# Patient Record
Sex: MALE | Race: BLACK OR AFRICAN AMERICAN | NOT HISPANIC OR LATINO | Employment: FULL TIME | URBAN - NONMETROPOLITAN AREA
[De-identification: names, ages, dates, MRNs, and addresses within clinical notes are randomized per-mention and may not be internally consistent; named-entity substitution may affect disease eponyms.]

---

## 2022-04-22 ENCOUNTER — OFFICE VISIT (OUTPATIENT)
Dept: FAMILY MEDICINE CLINIC | Facility: CLINIC | Age: 52
End: 2022-04-22

## 2022-04-22 VITALS
BODY MASS INDEX: 38.89 KG/M2 | TEMPERATURE: 98.7 F | HEIGHT: 66 IN | SYSTOLIC BLOOD PRESSURE: 130 MMHG | RESPIRATION RATE: 15 BRPM | OXYGEN SATURATION: 98 % | HEART RATE: 88 BPM | DIASTOLIC BLOOD PRESSURE: 82 MMHG | WEIGHT: 242 LBS

## 2022-04-22 DIAGNOSIS — E66.09 CLASS 2 OBESITY DUE TO EXCESS CALORIES WITH BODY MASS INDEX (BMI) OF 39.0 TO 39.9 IN ADULT, UNSPECIFIED WHETHER SERIOUS COMORBIDITY PRESENT: ICD-10-CM

## 2022-04-22 DIAGNOSIS — Z00.00 GENERAL MEDICAL EXAM: ICD-10-CM

## 2022-04-22 DIAGNOSIS — Z11.59 NEED FOR HEPATITIS C SCREENING TEST: ICD-10-CM

## 2022-04-22 DIAGNOSIS — Z13.220 SCREENING FOR HYPERLIPIDEMIA: ICD-10-CM

## 2022-04-22 DIAGNOSIS — Z12.5 SCREENING FOR PROSTATE CANCER: ICD-10-CM

## 2022-04-22 DIAGNOSIS — M25.512 ACUTE PAIN OF LEFT SHOULDER: Primary | ICD-10-CM

## 2022-04-22 DIAGNOSIS — Z13.1 SCREENING FOR DIABETES MELLITUS: ICD-10-CM

## 2022-04-22 PROBLEM — R79.89 DECREASED TESTOSTERONE LEVEL: Status: ACTIVE | Noted: 2022-04-22

## 2022-04-22 PROBLEM — R06.83 SNORING: Status: ACTIVE | Noted: 2022-04-22

## 2022-04-22 PROBLEM — N52.9 ERECTILE DYSFUNCTION: Status: ACTIVE | Noted: 2022-04-22

## 2022-04-22 PROBLEM — R53.83 FATIGUE: Status: ACTIVE | Noted: 2022-04-22

## 2022-04-22 PROBLEM — Z72.0 TOBACCO USER: Status: ACTIVE | Noted: 2022-04-22

## 2022-04-22 PROCEDURE — 99204 OFFICE O/P NEW MOD 45 MIN: CPT | Performed by: PHYSICIAN ASSISTANT

## 2022-04-22 RX ORDER — NABUMETONE 750 MG/1
750 TABLET, FILM COATED ORAL 2 TIMES DAILY
Qty: 60 TABLET | Refills: 1 | Status: SHIPPED | OUTPATIENT
Start: 2022-04-22 | End: 2022-11-02

## 2022-04-22 NOTE — PROGRESS NOTES
"      Patient Office Visit      Patient Name: Otilio Fletcher  : 1970   MRN: [id number]     Chief Complaint:    Chief Complaint   Patient presents with   • Shoulder Pain       History of Present Illness: Otilio Fletcher is a 51 y.o. male who is here today for left shoulder pain that started about a week ago.  He thinks this was caused by playing golf.  He is supposed to play golf again tomorrow and was hoping he could get a steroid shot to decrease inflammation.  He was taking some over-the-counter Aleve and this was helping.  He has some improvement but is still having stiffness and soreness left anterior shoulder area.  Patient says it has been years since he had any blood work.  He does note a history of borderline high blood sugar.    Subjective      Review of Systems:   Review of Systems   Musculoskeletal: Positive for arthralgias.        Past Medical History: History reviewed. No pertinent past medical history.    Past Surgical History:   Past Surgical History:   Procedure Laterality Date   • COLONOSCOPY         Family History:   Family History   Problem Relation Age of Onset   • Colon cancer Father        Social History:   Social History     Socioeconomic History   • Marital status:    Tobacco Use   • Smoking status: Never Smoker   • Smokeless tobacco: Never Used   Vaping Use   • Vaping Use: Never used   Substance and Sexual Activity   • Alcohol use: Defer   • Drug use: Defer   • Sexual activity: Defer       Allergies:   No Known Allergies    Objective     Physical Exam:  Vital Signs:   Vitals:    22 1348   BP: 130/82   BP Location: Left arm   Patient Position: Sitting   Cuff Size: Adult   Pulse: 88   Resp: 15   Temp: 98.7 °F (37.1 °C)   TempSrc: Temporal   SpO2: 98%   Weight: 110 kg (242 lb)   Height: 167.6 cm (66\")     Body mass index is 39.06 kg/m².        Physical Exam  Constitutional:       General: He is not in acute distress.     Appearance: Normal appearance. He is obese. "   Musculoskeletal:      Left shoulder: Tenderness ( Anterior) present. Normal range of motion.   Neurological:      Mental Status: He is alert.   Psychiatric:         Mood and Affect: Mood normal.         Behavior: Behavior normal.         Thought Content: Thought content normal.         Judgment: Judgment normal.         Assessment / Plan      Assessment/Plan:   Diagnoses and all orders for this visit:    1. Acute pain of left shoulder (Primary)  -     nabumetone (RELAFEN) 750 MG tablet; Take 1 tablet by mouth 2 (Two) Times a Day.  Dispense: 60 tablet; Refill: 1    I had a discussion with patient about the risk versus benefits of corticosteroid treatment.  Ideally this would be administered intra-articular to minimize systemic side effects.  With a history of borderline high blood sugar it would be best to avoid systemic corticosteroids.  I recommended some stretches and will try a prescription nonsteroidal anti-inflammatory.  I did give him a quantity of 60 with 1 refill of nabumetone 750 that can be taken twice a day.  I recommend he take this for a few days until symptoms improve then take only as needed for exacerbations.  He has not eaten all day so we will go ahead and get his blood work today for a physical and have him follow-up next week for the actual preventive physical.    2. Screening for diabetes mellitus  -     Hemoglobin A1c; Future  -     Hemoglobin A1c    3. Screening for hyperlipidemia  -     Lipid Panel; Future  -     Lipid Panel    4. Screening for prostate cancer  -     PSA Total, Reflex To Free; Future  -     PSA Total, Reflex To Free    5. General medical exam  -     CBC & Differential; Future  -     Comprehensive Metabolic Panel; Future  -     Lipid Panel; Future  -     Hemoglobin A1c; Future  -     Vitamin B12 & Folate; Future  -     Vitamin D 25 Hydroxy; Future  -     TSH; Future  -     Hepatitis C Antibody; Future  -     PSA Total, Reflex To Free; Future  -     PSA Total, Reflex To  Free  -     Hepatitis C Antibody  -     TSH  -     Vitamin D 25 Hydroxy  -     Vitamin B12 & Folate  -     Hemoglobin A1c  -     Lipid Panel  -     Comprehensive Metabolic Panel  -     CBC & Differential    6. Class 2 obesity due to excess calories with body mass index (BMI) of 39.0 to 39.9 in adult, unspecified whether serious comorbidity present    7. Need for hepatitis C screening test  -     Hepatitis C Antibody; Future  -     Hepatitis C Antibody    Medications:     Current Outpatient Medications:   •  nabumetone (RELAFEN) 750 MG tablet, Take 1 tablet by mouth 2 (Two) Times a Day., Disp: 60 tablet, Rfl: 1    I spent 30 minutes caring for Otilio on this date of service. This time includes time spent by me in the following activities:preparing for the visit, obtaining and/or reviewing a separately obtained history, performing a medically appropriate examination and/or evaluation , counseling and educating the patient/family/caregiver, ordering medications, tests, or procedures and documenting information in the medical record    Follow Up:   Return in about 1 week (around 4/29/2022) for Annual physical.    Anu Sarabia PA-C   [facility]

## 2022-04-23 LAB
25(OH)D3+25(OH)D2 SERPL-MCNC: 23.2 NG/ML (ref 30–100)
ALBUMIN SERPL-MCNC: 4.3 G/DL (ref 4–5)
ALBUMIN/GLOB SERPL: 1.5 {RATIO} (ref 1.2–2.2)
ALP SERPL-CCNC: 60 IU/L (ref 44–121)
ALT SERPL-CCNC: 34 IU/L (ref 0–44)
AST SERPL-CCNC: 25 IU/L (ref 0–40)
BASOPHILS # BLD AUTO: 0 X10E3/UL (ref 0–0.2)
BASOPHILS NFR BLD AUTO: 0 %
BILIRUB SERPL-MCNC: 0.3 MG/DL (ref 0–1.2)
BUN SERPL-MCNC: 14 MG/DL (ref 6–24)
BUN/CREAT SERPL: 13 (ref 9–20)
CALCIUM SERPL-MCNC: 9 MG/DL (ref 8.7–10.2)
CHLORIDE SERPL-SCNC: 104 MMOL/L (ref 96–106)
CHOLEST SERPL-MCNC: 219 MG/DL (ref 100–199)
CO2 SERPL-SCNC: 25 MMOL/L (ref 20–29)
CREAT SERPL-MCNC: 1.12 MG/DL (ref 0.76–1.27)
EGFRCR SERPLBLD CKD-EPI 2021: 80 ML/MIN/1.73
EOSINOPHIL # BLD AUTO: 0.1 X10E3/UL (ref 0–0.4)
EOSINOPHIL NFR BLD AUTO: 2 %
ERYTHROCYTE [DISTWIDTH] IN BLOOD BY AUTOMATED COUNT: 12.7 % (ref 11.6–15.4)
FOLATE SERPL-MCNC: 12.5 NG/ML
GLOBULIN SER CALC-MCNC: 2.9 G/DL (ref 1.5–4.5)
GLUCOSE SERPL-MCNC: 119 MG/DL (ref 65–99)
HBA1C MFR BLD: 5.8 % (ref 4.8–5.6)
HCT VFR BLD AUTO: 44.8 % (ref 37.5–51)
HCV AB S/CO SERPL IA: <0.1 S/CO RATIO (ref 0–0.9)
HDLC SERPL-MCNC: 45 MG/DL
HGB BLD-MCNC: 15 G/DL (ref 13–17.7)
IMM GRANULOCYTES # BLD AUTO: 0 X10E3/UL (ref 0–0.1)
IMM GRANULOCYTES NFR BLD AUTO: 0 %
LDLC SERPL CALC-MCNC: 141 MG/DL (ref 0–99)
LYMPHOCYTES # BLD AUTO: 2.5 X10E3/UL (ref 0.7–3.1)
LYMPHOCYTES NFR BLD AUTO: 37 %
MCH RBC QN AUTO: 29.1 PG (ref 26.6–33)
MCHC RBC AUTO-ENTMCNC: 33.5 G/DL (ref 31.5–35.7)
MCV RBC AUTO: 87 FL (ref 79–97)
MONOCYTES # BLD AUTO: 0.4 X10E3/UL (ref 0.1–0.9)
MONOCYTES NFR BLD AUTO: 6 %
NEUTROPHILS # BLD AUTO: 3.7 X10E3/UL (ref 1.4–7)
NEUTROPHILS NFR BLD AUTO: 55 %
PLATELET # BLD AUTO: 175 X10E3/UL (ref 150–450)
POTASSIUM SERPL-SCNC: 4.1 MMOL/L (ref 3.5–5.2)
PROT SERPL-MCNC: 7.2 G/DL (ref 6–8.5)
RBC # BLD AUTO: 5.16 X10E6/UL (ref 4.14–5.8)
SODIUM SERPL-SCNC: 141 MMOL/L (ref 134–144)
TRIGL SERPL-MCNC: 184 MG/DL (ref 0–149)
TSH SERPL DL<=0.005 MIU/L-ACNC: 1.38 UIU/ML (ref 0.45–4.5)
VIT B12 SERPL-MCNC: 947 PG/ML (ref 232–1245)
VLDLC SERPL CALC-MCNC: 33 MG/DL (ref 5–40)
WBC # BLD AUTO: 6.7 X10E3/UL (ref 3.4–10.8)

## 2022-04-25 LAB
PSA SERPL-MCNC: 0.4 NG/ML (ref 0–4)
REFLEX: NORMAL

## 2022-05-02 ENCOUNTER — OFFICE VISIT (OUTPATIENT)
Dept: FAMILY MEDICINE CLINIC | Facility: CLINIC | Age: 52
End: 2022-05-02

## 2022-05-02 VITALS
BODY MASS INDEX: 40.02 KG/M2 | RESPIRATION RATE: 15 BRPM | TEMPERATURE: 97.5 F | OXYGEN SATURATION: 98 % | WEIGHT: 249 LBS | HEART RATE: 78 BPM | DIASTOLIC BLOOD PRESSURE: 80 MMHG | SYSTOLIC BLOOD PRESSURE: 120 MMHG | HEIGHT: 66 IN

## 2022-05-02 DIAGNOSIS — S63.657S: ICD-10-CM

## 2022-05-02 DIAGNOSIS — Z79.899 HIGH RISK MEDICATION USE: ICD-10-CM

## 2022-05-02 DIAGNOSIS — R73.03 PREDIABETES: ICD-10-CM

## 2022-05-02 DIAGNOSIS — E78.2 MIXED HYPERLIPIDEMIA: ICD-10-CM

## 2022-05-02 DIAGNOSIS — E66.01 MORBID (SEVERE) OBESITY DUE TO EXCESS CALORIES: ICD-10-CM

## 2022-05-02 DIAGNOSIS — Z00.00 GENERAL MEDICAL EXAM: Primary | ICD-10-CM

## 2022-05-02 DIAGNOSIS — E55.9 VITAMIN D DEFICIENCY: ICD-10-CM

## 2022-05-02 PROBLEM — Z72.0 TOBACCO USER: Status: RESOLVED | Noted: 2022-04-22 | Resolved: 2022-05-02

## 2022-05-02 PROCEDURE — 99396 PREV VISIT EST AGE 40-64: CPT | Performed by: PHYSICIAN ASSISTANT

## 2022-05-02 RX ORDER — METFORMIN HYDROCHLORIDE 500 MG/1
500 TABLET, EXTENDED RELEASE ORAL
Qty: 90 TABLET | Refills: 1 | Status: SHIPPED | OUTPATIENT
Start: 2022-05-02 | End: 2022-11-02

## 2022-05-02 NOTE — PROGRESS NOTES
Annual Physical-Preventive Visit     Patient Name: Otilio Fletcher  : 1970   MRN: [id number]     Chief Complaint:    Chief Complaint   Patient presents with   • Annual Exam       History of Present Illness: Otilio Fletcher is a 51 y.o. male who is here today for their annual health maintenance and physical.  He had his labs drawn last visit and we need to review those.  Subjective      Review of Systems:   Review of Systems   Constitutional: Negative for fatigue.   Respiratory: Negative for shortness of breath.    Cardiovascular: Negative for chest pain, palpitations and leg swelling.        Past History:  Medical History: has a past medical history of Arnold-Chiari deformity (HCC), Bone cyst, Erectile dysfunction, Fatigue, Low testosterone, Snoring, and Tobacco use disorder.   Surgical History: has a past surgical history that includes Colonoscopy and Other surgical history.   Family History: family history includes Colon cancer in his father; Diabetes in an other family member; Hearing loss in an other family member; Hypertension in his father.   Social History: reports that he has never smoked. He has never used smokeless tobacco. Alcohol use questions deferred to the physician. Drug use questions deferred to the physician.    Health Maintenance   Topic Date Due   • ANNUAL PHYSICAL  Never done   • ZOSTER VACCINE (1 of 2) Never done   • COVID-19 Vaccine (2 - Booster for Reta series) 2021   • TDAP/TD VACCINES (1 - Tdap) 2023 (Originally 6/15/1990)   • INFLUENZA VACCINE  2022   • LIPID PANEL  2023   • COLORECTAL CANCER SCREENING  2027   • HEPATITIS C SCREENING  Completed   • Pneumococcal Vaccine 0-64  Aged Out        Immunization History   Administered Date(s) Administered   • COVID-19 (RETA) 2021       Medications:     Current Outpatient Medications:   •  nabumetone (RELAFEN) 750 MG tablet, Take 1 tablet by mouth 2 (Two) Times a Day., Disp: 60 tablet, Rfl: 1  •   "metFORMIN ER (GLUCOPHAGE-XR) 500 MG 24 hr tablet, Take 1 tablet by mouth Daily With Breakfast., Disp: 90 tablet, Rfl: 1    Allergies:   No Known Allergies    Depression: PHQ-2 Depression Screening  Little interest or pleasure in doing things?     Feeling down, depressed, or hopeless?     PHQ-2 Total Score        Predictive Model Details   No score data available for Risk of Fall         Objective     Physical Exam:  Vital Signs:   Vitals:    05/02/22 1529   BP: 120/80   BP Location: Left arm   Patient Position: Sitting   Cuff Size: Adult   Pulse: 78   Resp: 15   Temp: 97.5 °F (36.4 °C)   TempSrc: Temporal   SpO2: 98%   Weight: 113 kg (249 lb)   Height: 167.6 cm (66\")     Body mass index is 40.19 kg/m².   Class 3 Severe Obesity (BMI >=40). Obesity-related health conditions include the following: none. Obesity is newly identified. BMI is is above average; BMI management plan is completed. We discussed low calorie, low carb based diet program, portion control and increasing exercise.       Physical Exam  Constitutional:       Appearance: He is obese.   Cardiovascular:      Rate and Rhythm: Normal rate and regular rhythm.   Pulmonary:      Effort: Pulmonary effort is normal.      Breath sounds: Normal breath sounds.   Musculoskeletal:      Comments: There is some pain left MCP joint when he moves his little finger laterally.  This started about a month ago after he swung a golf club.  He notes about 50% improvement since this started.   Neurological:      General: No focal deficit present.   Psychiatric:         Thought Content: Thought content normal.         Judgment: Judgment normal.         Procedures    Assessment / Plan      Assessment/Plan:   Diagnoses and all orders for this visit:    1. General medical exam (Primary)    2. Prediabetes  -     metFORMIN ER (GLUCOPHAGE-XR) 500 MG 24 hr tablet; Take 1 tablet by mouth Daily With Breakfast.  Dispense: 90 tablet; Refill: 1  -     Hemoglobin A1c; Future    His " hemoglobin A1c was 5.8.  He is agreeable to starting metformin to slow progression.  Also counseled extensively on healthy diet and exercise to lose weight to keep this from further progressing.    3. Mixed hyperlipidemia  -     Lipid Panel; Future    I reviewed patient's lab results with him.  His total cholesterol is 219, HDL 45, , triglycerides 184.  His labs were not checked fasting.  I calculated cardiovascular risk score and this is less than 7.5% and he was borderline as to whether a statin was recommended.  He prefers to work on diet and exercise to lose some weight before considering a cholesterol-lowering medication.  He denies any family history of heart disease.    4. Morbid (severe) obesity due to excess calories (HCC)    Patient asked for a copy of his lab report as he does plan on seeing someone at [facility] regarding weight loss.    5. High risk medication use  -     CBC & Differential; Future  -     Comprehensive Metabolic Panel; Future    6. Sprain of metacarpophalangeal (MCP) joint of left little finger, sequela    Improving.  I recommend giving this more time.    7.  Vitamin D deficiency    I instructed patient to start vitamin D 4000 IUs over-the-counter daily and we will recheck a vitamin D prior to next visit in 6 months.        Current Outpatient Medications:   •  nabumetone (RELAFEN) 750 MG tablet, Take 1 tablet by mouth 2 (Two) Times a Day., Disp: 60 tablet, Rfl: 1  •  metFORMIN ER (GLUCOPHAGE-XR) 500 MG 24 hr tablet, Take 1 tablet by mouth Daily With Breakfast., Disp: 90 tablet, Rfl: 1    Follow Up:   Return in about 6 months (around 11/2/2022) for Recheck.    Healthcare Maintenance:   Counseling provided on losing weight with diet and exercise.  I also counseled him on updating vaccinations.  He declines a Tdap today.  I did strongly encourage him to get the shingles vaccine at the pharmacy, he will consider this.  He and his wife both plan to get their COVID boosters in the next  few weeks.    Otilio Fletcher voices understanding and acceptance of this advice.  AVS with preventive healthcare tips printed for patient.     Anu Sarabia PA-C  [facility]

## 2022-11-02 ENCOUNTER — OFFICE VISIT (OUTPATIENT)
Dept: FAMILY MEDICINE CLINIC | Facility: CLINIC | Age: 52
End: 2022-11-02

## 2022-11-02 VITALS
HEART RATE: 76 BPM | SYSTOLIC BLOOD PRESSURE: 142 MMHG | OXYGEN SATURATION: 96 % | BODY MASS INDEX: 37.86 KG/M2 | DIASTOLIC BLOOD PRESSURE: 82 MMHG | WEIGHT: 235.6 LBS | HEIGHT: 66 IN

## 2022-11-02 DIAGNOSIS — R73.03 PREDIABETES: Primary | ICD-10-CM

## 2022-11-02 DIAGNOSIS — E78.2 MIXED HYPERLIPIDEMIA: ICD-10-CM

## 2022-11-02 DIAGNOSIS — Z79.899 HIGH RISK MEDICATION USE: ICD-10-CM

## 2022-11-02 DIAGNOSIS — E55.9 VITAMIN D DEFICIENCY: ICD-10-CM

## 2022-11-02 PROCEDURE — 99214 OFFICE O/P EST MOD 30 MIN: CPT | Performed by: FAMILY MEDICINE

## 2022-11-03 LAB
25(OH)D3+25(OH)D2 SERPL-MCNC: 29.4 NG/ML (ref 30–100)
ALBUMIN SERPL-MCNC: 4.2 G/DL (ref 3.8–4.9)
ALBUMIN/GLOB SERPL: 1.4 {RATIO} (ref 1.2–2.2)
ALP SERPL-CCNC: 62 IU/L (ref 44–121)
ALT SERPL-CCNC: 21 IU/L (ref 0–44)
AST SERPL-CCNC: 23 IU/L (ref 0–40)
BASOPHILS # BLD AUTO: 0 X10E3/UL (ref 0–0.2)
BASOPHILS NFR BLD AUTO: 1 %
BILIRUB SERPL-MCNC: 0.4 MG/DL (ref 0–1.2)
BUN SERPL-MCNC: 17 MG/DL (ref 6–24)
BUN/CREAT SERPL: 14 (ref 9–20)
CALCIUM SERPL-MCNC: 9.3 MG/DL (ref 8.7–10.2)
CHLORIDE SERPL-SCNC: 103 MMOL/L (ref 96–106)
CHOLEST SERPL-MCNC: 214 MG/DL (ref 100–199)
CO2 SERPL-SCNC: 24 MMOL/L (ref 20–29)
CREAT SERPL-MCNC: 1.23 MG/DL (ref 0.76–1.27)
EGFRCR SERPLBLD CKD-EPI 2021: 71 ML/MIN/1.73
EOSINOPHIL # BLD AUTO: 0.2 X10E3/UL (ref 0–0.4)
EOSINOPHIL NFR BLD AUTO: 2 %
ERYTHROCYTE [DISTWIDTH] IN BLOOD BY AUTOMATED COUNT: 13 % (ref 11.6–15.4)
GLOBULIN SER CALC-MCNC: 3 G/DL (ref 1.5–4.5)
GLUCOSE SERPL-MCNC: 100 MG/DL (ref 70–99)
HBA1C MFR BLD: 5.7 % (ref 4.8–5.6)
HCT VFR BLD AUTO: 42.5 % (ref 37.5–51)
HDLC SERPL-MCNC: 41 MG/DL
HGB BLD-MCNC: 14.5 G/DL (ref 13–17.7)
IMM GRANULOCYTES # BLD AUTO: 0 X10E3/UL (ref 0–0.1)
IMM GRANULOCYTES NFR BLD AUTO: 0 %
LDLC SERPL CALC-MCNC: 137 MG/DL (ref 0–99)
LYMPHOCYTES # BLD AUTO: 2.7 X10E3/UL (ref 0.7–3.1)
LYMPHOCYTES NFR BLD AUTO: 40 %
MCH RBC QN AUTO: 30.1 PG (ref 26.6–33)
MCHC RBC AUTO-ENTMCNC: 34.1 G/DL (ref 31.5–35.7)
MCV RBC AUTO: 88 FL (ref 79–97)
MONOCYTES # BLD AUTO: 0.5 X10E3/UL (ref 0.1–0.9)
MONOCYTES NFR BLD AUTO: 7 %
NEUTROPHILS # BLD AUTO: 3.3 X10E3/UL (ref 1.4–7)
NEUTROPHILS NFR BLD AUTO: 50 %
PLATELET # BLD AUTO: 178 X10E3/UL (ref 150–450)
POTASSIUM SERPL-SCNC: 4.3 MMOL/L (ref 3.5–5.2)
PROT SERPL-MCNC: 7.2 G/DL (ref 6–8.5)
RBC # BLD AUTO: 4.81 X10E6/UL (ref 4.14–5.8)
SODIUM SERPL-SCNC: 139 MMOL/L (ref 134–144)
TRIGL SERPL-MCNC: 198 MG/DL (ref 0–149)
VLDLC SERPL CALC-MCNC: 36 MG/DL (ref 5–40)
WBC # BLD AUTO: 6.6 X10E3/UL (ref 3.4–10.8)

## 2022-11-03 NOTE — PROGRESS NOTES
"Chief Complaint  Diabetes    Subjective          Otilio Fletcher presents to [facility]  History of Present Illness  Patient is a 52-year-old male who presents for blood work follow-up        Objective   Vital Signs:   /82   Pulse 76   Ht 167.6 cm (65.98\")   Wt 107 kg (235 lb 9.6 oz)   SpO2 96%   BMI 38.05 kg/m²     Body mass index is 38.05 kg/m².    Review of Systems   Constitutional: Negative.    HENT: Negative.    Eyes: Negative.    Respiratory: Negative.    Cardiovascular: Negative.    Gastrointestinal: Negative.    Endocrine: Negative.    Genitourinary: Negative.    Musculoskeletal: Negative.    Skin: Negative.    Allergic/Immunologic: Negative.    Neurological: Negative.    Hematological: Negative.    Psychiatric/Behavioral: Negative.        Past History:  Medical History: has a past medical history of Arnold-Chiari deformity (HCC), Bone cyst, Erectile dysfunction, Fatigue, Low testosterone, Snoring, and Tobacco use disorder.   Surgical History: has a past surgical history that includes Colonoscopy and Other surgical history.   Family History: family history includes Colon cancer in his father; Diabetes in an other family member; Hearing loss in an other family member; Hypertension in his father.   Social History: reports that he has never smoked. He has never used smokeless tobacco. Alcohol use questions deferred to the physician. Drug use questions deferred to the physician.    No current outpatient medications on file.    Allergies: Patient has no known allergies.    Physical Exam  Constitutional:       Appearance: Normal appearance. He is normal weight.   HENT:      Head: Normocephalic and atraumatic.   Eyes:      Pupils: Pupils are equal, round, and reactive to light.   Cardiovascular:      Rate and Rhythm: Normal rate and regular rhythm.      Pulses: Normal pulses.      Heart sounds: Normal heart sounds.   Pulmonary:      Effort: Pulmonary effort is normal.      " Breath sounds: Normal breath sounds.   Musculoskeletal:      Cervical back: Normal range of motion and neck supple.   Skin:     General: Skin is warm and dry.   Neurological:      General: No focal deficit present.      Mental Status: He is alert. Mental status is at baseline.   Psychiatric:         Mood and Affect: Mood normal.         Behavior: Behavior normal.         Thought Content: Thought content normal.         Judgment: Judgment normal.          Result Review :                   Assessment and Plan    Diagnoses and all orders for this visit:    1. Prediabetes (Primary)  -     Hemoglobin A1c; Future  -     Lipid Panel; Future  -     Comprehensive Metabolic Panel; Future  -     Hemoglobin A1c  -     Cancel: Hemoglobin A1c  -     Cancel: Lipid Panel  -     Cancel: Comprehensive Metabolic Panel  Blood work follow-up    2. High risk medication use  -     CBC & Differential  -     Comprehensive Metabolic Panel    3. Mixed hyperlipidemia  -     Lipid Panel  Blood work follow-up    4. Vitamin D deficiency  -     Vitamin D 25 Hydroxy          Follow Up   No follow-ups on file.  Patient was given instructions and counseling regarding his condition or for health maintenance advice. Please see specific information pulled into the AVS if appropriate.     Ida Dickerson DO

## 2024-05-07 ENCOUNTER — OFFICE VISIT (OUTPATIENT)
Dept: FAMILY MEDICINE CLINIC | Facility: CLINIC | Age: 54
End: 2024-05-07
Payer: COMMERCIAL

## 2024-05-07 VITALS
BODY MASS INDEX: 39.25 KG/M2 | HEIGHT: 66 IN | DIASTOLIC BLOOD PRESSURE: 90 MMHG | RESPIRATION RATE: 18 BRPM | WEIGHT: 244.2 LBS | OXYGEN SATURATION: 98 % | HEART RATE: 76 BPM | SYSTOLIC BLOOD PRESSURE: 132 MMHG

## 2024-05-07 DIAGNOSIS — R10.9 FLANK PAIN: ICD-10-CM

## 2024-05-07 DIAGNOSIS — R73.03 PREDIABETES: ICD-10-CM

## 2024-05-07 DIAGNOSIS — E78.5 DYSLIPIDEMIA: ICD-10-CM

## 2024-05-07 DIAGNOSIS — Z00.00 ANNUAL PHYSICAL EXAM: Primary | ICD-10-CM

## 2024-05-07 LAB
BILIRUB BLD-MCNC: NEGATIVE MG/DL
CLARITY, POC: CLEAR
COLOR UR: YELLOW
EXPIRATION DATE: ABNORMAL
GLUCOSE UR STRIP-MCNC: NEGATIVE MG/DL
KETONES UR QL: NEGATIVE
LEUKOCYTE EST, POC: NEGATIVE
Lab: ABNORMAL
NITRITE UR-MCNC: NEGATIVE MG/ML
PH UR: 6 [PH] (ref 5–8)
PROT UR STRIP-MCNC: NEGATIVE MG/DL
RBC # UR STRIP: ABNORMAL /UL
SP GR UR: 1.03 (ref 1–1.03)
UROBILINOGEN UR QL: NORMAL

## 2024-05-07 PROCEDURE — 99396 PREV VISIT EST AGE 40-64: CPT

## 2024-05-07 PROCEDURE — 81003 URINALYSIS AUTO W/O SCOPE: CPT

## 2024-05-08 LAB
BASOPHILS # BLD AUTO: 0 X10E3/UL (ref 0–0.2)
BASOPHILS NFR BLD AUTO: 1 %
EOSINOPHIL # BLD AUTO: 0.2 X10E3/UL (ref 0–0.4)
EOSINOPHIL NFR BLD AUTO: 3 %
ERYTHROCYTE [DISTWIDTH] IN BLOOD BY AUTOMATED COUNT: 13.3 % (ref 11.6–15.4)
HBA1C MFR BLD: 5.7 % (ref 4.8–5.6)
HCT VFR BLD AUTO: 46.3 % (ref 37.5–51)
HGB BLD-MCNC: 15.3 G/DL (ref 13–17.7)
IMM GRANULOCYTES # BLD AUTO: 0 X10E3/UL (ref 0–0.1)
IMM GRANULOCYTES NFR BLD AUTO: 0 %
LYMPHOCYTES # BLD AUTO: 3 X10E3/UL (ref 0.7–3.1)
LYMPHOCYTES NFR BLD AUTO: 44 %
MCH RBC QN AUTO: 29.7 PG (ref 26.6–33)
MCHC RBC AUTO-ENTMCNC: 33 G/DL (ref 31.5–35.7)
MCV RBC AUTO: 90 FL (ref 79–97)
MONOCYTES # BLD AUTO: 0.5 X10E3/UL (ref 0.1–0.9)
MONOCYTES NFR BLD AUTO: 7 %
NEUTROPHILS # BLD AUTO: 3 X10E3/UL (ref 1.4–7)
NEUTROPHILS NFR BLD AUTO: 45 %
PLATELET # BLD AUTO: 165 X10E3/UL (ref 150–450)
RBC # BLD AUTO: 5.15 X10E6/UL (ref 4.14–5.8)
WBC # BLD AUTO: 6.7 X10E3/UL (ref 3.4–10.8)

## 2024-05-09 LAB
ALBUMIN SERPL-MCNC: 4.3 G/DL (ref 3.8–4.9)
ALBUMIN/GLOB SERPL: 1.5 {RATIO} (ref 1.2–2.2)
ALP SERPL-CCNC: 55 IU/L (ref 44–121)
ALT SERPL-CCNC: 28 IU/L (ref 0–44)
AST SERPL-CCNC: 27 IU/L (ref 0–40)
BACTERIA UR CULT: NO GROWTH
BACTERIA UR CULT: NORMAL
BILIRUB SERPL-MCNC: 0.3 MG/DL (ref 0–1.2)
BUN SERPL-MCNC: 12 MG/DL (ref 6–24)
BUN/CREAT SERPL: 10 (ref 9–20)
CALCIUM SERPL-MCNC: 9.1 MG/DL (ref 8.7–10.2)
CHLORIDE SERPL-SCNC: 104 MMOL/L (ref 96–106)
CHOLEST SERPL-MCNC: 233 MG/DL (ref 100–199)
CO2 SERPL-SCNC: 21 MMOL/L (ref 20–29)
CREAT SERPL-MCNC: 1.25 MG/DL (ref 0.76–1.27)
EGFRCR SERPLBLD CKD-EPI 2021: 69 ML/MIN/1.73
GLOBULIN SER CALC-MCNC: 2.9 G/DL (ref 1.5–4.5)
GLUCOSE SERPL-MCNC: 98 MG/DL (ref 70–99)
HDLC SERPL-MCNC: 51 MG/DL
LDLC SERPL CALC-MCNC: 160 MG/DL (ref 0–99)
POTASSIUM SERPL-SCNC: 4.2 MMOL/L (ref 3.5–5.2)
PROT SERPL-MCNC: 7.2 G/DL (ref 6–8.5)
SODIUM SERPL-SCNC: 140 MMOL/L (ref 134–144)
TRIGL SERPL-MCNC: 124 MG/DL (ref 0–149)
TSH SERPL DL<=0.005 MIU/L-ACNC: 3.38 UIU/ML (ref 0.45–4.5)
VLDLC SERPL CALC-MCNC: 22 MG/DL (ref 5–40)

## 2024-05-21 ENCOUNTER — OFFICE VISIT (OUTPATIENT)
Dept: FAMILY MEDICINE CLINIC | Facility: CLINIC | Age: 54
End: 2024-05-21
Payer: COMMERCIAL

## 2024-05-21 VITALS
WEIGHT: 246.1 LBS | RESPIRATION RATE: 18 BRPM | HEART RATE: 67 BPM | BODY MASS INDEX: 39.55 KG/M2 | HEIGHT: 66 IN | DIASTOLIC BLOOD PRESSURE: 88 MMHG | OXYGEN SATURATION: 98 % | SYSTOLIC BLOOD PRESSURE: 122 MMHG

## 2024-05-21 DIAGNOSIS — E78.2 MIXED HYPERLIPIDEMIA: Primary | ICD-10-CM

## 2024-05-21 DIAGNOSIS — R41.840 POOR CONCENTRATION: ICD-10-CM

## 2024-05-21 DIAGNOSIS — R73.03 PREDIABETES: ICD-10-CM

## 2024-05-21 DIAGNOSIS — R10.9 FLANK PAIN: ICD-10-CM

## 2024-05-21 PROCEDURE — 99214 OFFICE O/P EST MOD 30 MIN: CPT

## 2024-05-21 NOTE — PROGRESS NOTES
"     Follow Up Office Visit      Date:  2024   Patient Name: Otilio Fletcher  : 1970   MRN: [id number]     Chief Complaint:    Chief Complaint   Patient presents with    Results     Follow up on labs.        History of Present Illness: Otilio Fletcher is a 53 y.o. male who is here today to follow up on lab results. Pt c/o feeling fatigue \"all the time\". 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  He also verbalized some difficulty with focus which is interfering with his work at times.  Otherwise, patient denies illness or injury since last visit. Denies falls, unexplained weight change, fevers, chills, or other constitutional symptoms and has no additional questions or concens at this time.    Discussed water intake  Works second shift at [employer]  Fatigue - \"all the time\"   Difficulty with focus - interfering with work     Subjective      Answers submitted by the patient for this visit:  Office Visit on 2024  8:15 AM with Cindy Peterson (Submitted on 2024)  Please describe your symptoms.: Blood work done.  Have you had these symptoms before?: No  How long have you been having these symptoms?: 1-4 days      Past Medical History:   Past Medical History:   Diagnosis Date    Arnold-Chiari deformity     1    Bone cyst     WRIST    Erectile dysfunction     Fatigue     Low testosterone     Snoring     Tobacco use disorder        Past Surgical History:   Past Surgical History:   Procedure Laterality Date    BRAIN SURGERY  2010    Chiari    COLONOSCOPY      OTHER SURGICAL HISTORY      ARNOLD CHIARI 1 SURGERY       Family History:   Family History   Problem Relation Age of Onset    Colon cancer Father     Hypertension Father     Cancer Father         Colon    Diabetes Other     Hearing loss Other         HEARING IMPAIRMENT       Social History:   Social History     Socioeconomic History    Marital status:    Tobacco Use    Smoking status: Never    Smokeless tobacco: Never " "  Vaping Use    Vaping status: Never Used   Substance and Sexual Activity    Alcohol use: Yes     Alcohol/week: 3.0 standard drinks of alcohol     Types: 1 Cans of beer, 2 Drinks containing 0.5 oz of alcohol per week    Drug use: Never    Sexual activity: Yes     Partners: Female       Medications:   No current outpatient medications on file.    Allergies:   No Known Allergies    Objective     Physical Exam  Vitals and nursing note reviewed.   Constitutional:       General: He is not in acute distress.     Appearance: Normal appearance. He is not toxic-appearing.   HENT:      Head: Normocephalic.   Eyes:      Pupils: Pupils are equal, round, and reactive to light.   Cardiovascular:      Rate and Rhythm: Normal rate and regular rhythm.      Heart sounds: No murmur heard.  Pulmonary:      Effort: Pulmonary effort is normal. No respiratory distress.      Breath sounds: Normal breath sounds.   Musculoskeletal:         General: Normal range of motion.      Cervical back: Normal range of motion and neck supple.      Right lower leg: No edema.      Left lower leg: No edema.   Skin:     General: Skin is warm and dry.   Neurological:      Mental Status: He is alert.   Psychiatric:         Mood and Affect: Mood normal.         Behavior: Behavior normal.       Vitals:    05/21/24 0810   BP: 122/88   BP Location: Left arm   Patient Position: Sitting   Cuff Size: Large Adult   Pulse: 67   Resp: 18   SpO2: 98%   Weight: 112 kg (246 lb 1.6 oz)   Height: 167.6 cm (65.98\")   PainSc: 0-No pain     Body mass index is 39.74 kg/m².             The 10-year ASCVD risk score (Anatoliy CARLSON, et al., 2019) is: 5.6%    Values used to calculate the score:      Age: 53 years      Sex: Male      Is Non- : Yes      Diabetic: No      Tobacco smoker: No      Systolic Blood Pressure: 122 mmHg      Is BP treated: No      HDL Cholesterol: 51 mg/dL      Total Cholesterol: 233 mg/dL      Assessment / Plan      Assessment/Plan: "   Diagnoses and all orders for this visit:    1. Mixed hyperlipidemia (Primary)  Assessment & Plan:   Lipid abnormalities are newly identified    Plan:  Lipid lowering therapy not prescribed due to shared decision making including discussion of ASCVD risk score. Pt agrees to diet, lifestyle change, and re-evaluate in 6 months and discussion about pharmaceutical intervention as indicated.    Discussed medication dosage, use, side effects, and goals of treatment in detail.    Counseled patient on lifestyle modifications to help control hyperlipidemia.   Cholesterol lowering dietary information shared with patient.    Patient Treatment Goals:   LDL goal is less than 70  Total Cholesterol Goal is less than 180    Followup in 6 months.      2. Prediabetes  Assessment & Plan:  Pre-diabetes unchanged since last lab draw. Pt remains 5.7%.  Discussed diet and lifestyle for prevention of DM2.  Patient education materials attached to AVS related to ADA diet. Materials were reviewed with patient during their office visit today and all questions addressed.  No change in treatment plan at this time.  Will re-evaluate as needed and/or at routine follow-up visits      3. Flank pain  Assessment & Plan:  Discussed patient's most recent lab results today including negative culture report.  Patient is asymptomatic, we will continue to watch this and if microscopic hematuria persists at next office visit in 4 weeks we will consider another referral to urology  No change in treatment regimen at this time, we will reevaluate at follow-up visit in 4 weeks.      4. Poor concentration  Assessment & Plan:  Patient reports difficulty with focus at work which is interfering with his job at times.  Patient is supervisor at [employer].  We discussed sleep hygiene and the importance of getting enough rest for mental focus.  Patient agrees to referral to [facility] for evaluation.  Patient to follow-up as needed and at routine  follow-up visits.    Orders:  -     Ambulatory Referral to Behavioral Health         Most recent diagnostic testing results were reviewed and discussed with the patient during their appointment today and all questions addressed to patient's satisfaction.     Follow Up:   Return in about 4 weeks (around 6/18/2024) for Recheck - f/u fatigue and difficulty with focus.    [facility] ([facility]  [address] [place] [id number] [phone]    NOTE TO PATIENT: The 21st Century Cures Act makes medical notes like these available to patients in the interest of transparency. However, be advised this is a medical document. It is intended as peer to peer communication. It is written in medical language and may contain abbreviations or verbiage that are unfamiliar. It may appear blunt or direct. Medical documents are intended to carry relevant information, facts as evident, and the clinical opinion of the practitioner.

## 2024-05-30 ENCOUNTER — TELEPHONE (OUTPATIENT)
Dept: FAMILY MEDICINE CLINIC | Facility: CLINIC | Age: 54
End: 2024-05-30
Payer: COMMERCIAL

## 2024-05-30 NOTE — TELEPHONE ENCOUNTER
HUB TO RELAY    PLEASE LET PATIENT KNOW HIS APPOINTMENT ON 6/20 WITH NELLY WATTS IS NEEDING TO BE RESCHEDULED    I LEFT PATIENT A VOICEMAIL MAKING HIM AWARE.

## 2024-06-09 PROBLEM — R41.840 POOR CONCENTRATION: Status: ACTIVE | Noted: 2024-06-09

## 2024-06-09 NOTE — ASSESSMENT & PLAN NOTE
Discussed patient's most recent lab results today including negative culture report.  Patient is asymptomatic, we will continue to watch this and if microscopic hematuria persists at next office visit in 4 weeks we will consider another referral to urology  No change in treatment regimen at this time, we will reevaluate at follow-up visit in 4 weeks.

## 2024-06-09 NOTE — ASSESSMENT & PLAN NOTE
Pre-diabetes unchanged since last lab draw. Pt remains 5.7%.  Discussed diet and lifestyle for prevention of DM2.  Patient education materials attached to AVS related to ADA diet. Materials were reviewed with patient during their office visit today and all questions addressed.  No change in treatment plan at this time.  Will re-evaluate as needed and/or at routine follow-up visits

## 2024-06-09 NOTE — ASSESSMENT & PLAN NOTE
Patient reports difficulty with focus at work which is interfering with his job at times.  Patient is supervisor at [employer].  We discussed sleep hygiene and the importance of getting enough rest for mental focus.  Patient agrees to referral to [facility] for evaluation.  Patient to follow-up as needed and at routine follow-up visits.

## 2024-06-09 NOTE — ASSESSMENT & PLAN NOTE
Lipid abnormalities are newly identified    Plan:  Lipid lowering therapy not prescribed due to shared decision making including discussion of ASCVD risk score. Pt agrees to diet, lifestyle change, and re-evaluate in 6 months and discussion about pharmaceutical intervention as indicated.    Discussed medication dosage, use, side effects, and goals of treatment in detail.    Counseled patient on lifestyle modifications to help control hyperlipidemia.   Cholesterol lowering dietary information shared with patient.    Patient Treatment Goals:   LDL goal is less than 70  Total Cholesterol Goal is less than 180    Followup in 6 months.

## 2024-06-25 ENCOUNTER — OFFICE VISIT (OUTPATIENT)
Dept: FAMILY MEDICINE CLINIC | Facility: CLINIC | Age: 54
End: 2024-06-25
Payer: COMMERCIAL

## 2024-06-25 VITALS
BODY MASS INDEX: 39.44 KG/M2 | DIASTOLIC BLOOD PRESSURE: 88 MMHG | OXYGEN SATURATION: 98 % | HEART RATE: 86 BPM | RESPIRATION RATE: 18 BRPM | WEIGHT: 245.4 LBS | HEIGHT: 66 IN | SYSTOLIC BLOOD PRESSURE: 138 MMHG

## 2024-06-25 DIAGNOSIS — R41.840 POOR CONCENTRATION: ICD-10-CM

## 2024-06-25 DIAGNOSIS — R51.9 INCREASED FREQUENCY OF HEADACHES: Primary | ICD-10-CM

## 2024-06-25 PROCEDURE — 99213 OFFICE O/P EST LOW 20 MIN: CPT

## 2024-06-25 NOTE — PATIENT INSTRUCTIONS

## 2024-06-25 NOTE — ASSESSMENT & PLAN NOTE
Patient reports past history of Chiari malformation, had surgical intervention for this in February 2010.  Since that time patient has not had issue but recently in the past 2 weeks has developed increased frequency of intermittent headache, atypical of previous headaches.  See HPI  Patient agrees to CT scan today and follow-up in 2 weeks unless otherwise indicated.  Reviewed signs and symptoms of stroke and heart attack and patient agrees to go to the ER or call 911 as appropriate, if he develops any alarm symptoms prior to his scheduled follow-up visit.

## 2024-06-25 NOTE — ASSESSMENT & PLAN NOTE
Patient continues to have some issues with focus, unchanged since last visit.  Patient does have appointment scheduled with behavioral health for evaluation-earliest appointment is scheduled in September.  Patient is agreeable to referral to alternative provider to see if he is able to get earlier appointment.  No change in treatment regimen at this time, will re- evaluate as needed or at routine wellness visits.

## 2024-06-25 NOTE — PROGRESS NOTES
Follow Up Office Visit      Date:  2024   Patient Name: Otilio Fletcher  : 1970   MRN: [id number]     Chief Complaint:    Chief Complaint   Patient presents with   • Fatigue     Follow up. Patient states that fatigue and memory issues are still the same.    • Headache     Patient has a history of brain surgery. States headaches have been on and off, mainly over right eye.        History of Present Illness: Otilio Fletcher is a 54 y.o. male who is here today to follow up on fatigue and memory issues.     CT head with and without contrast  Hx of chiari malformation - s/p surgery     Fatigue  This is a recurrent problem. The current episode started more than 1 year ago. The problem occurs intermittently. Associated symptoms include fatigue, headaches and nausea. Pertinent negatives include no chest pain, fever, vertigo, visual change, vomiting or weakness. Nothing aggravates the symptoms. He has tried drinking for the symptoms.   Headache  Headache pattern:  Headache sometimes there, sometimes not at all  Initial event:  None  Other event details:  Pt has hx of chiari sx  Recent changes:  Headaches come more often than they used to and headaches are shorter than they used to be  Frequency:  2 to 3 per week  Providers seen:  Neurosurgeon and neurologist  Lifetime total:  20+  Number of ER visits for headache:  0  Time of day symptoms are worse:  No specific time of day  Do headaches wake patient from sleep?: No    Days of the week symptoms are worse:  No specific day of the week  Season symptoms are worse:  No particular season  Quality:  Sharp and stabbing  Laterality:  Right side only  Location:  Temples/sides  Pain severity:  5  Aggravating factors:  None  Allodynia triggers:  Wearing a hat  Changes in thinking and mood:  None  Changes in vision:  None  Bilateral symptoms:  None  Unilateral symptoms:  Tingling  Other unilateral symptoms:  Left side of face - lasting longer than headache  Stomach/GI  changes:  Nausea  Changes in sensation:  None  Abortive medications tried:  None  Preventative medications tried:  None  Vitamins and supplements tried:  None  Alternative treatments tried:  None       Review of Systems   Constitutional:  Positive for fatigue. Negative for fever.   HENT:          Denies jaw claudication   Eyes:  Negative for blurred vision, double vision, photophobia and visual disturbance.   Cardiovascular:  Negative for chest pain.   Gastrointestinal:  Positive for nausea. Negative for vomiting.   Neurological:  Negative for vertigo and weakness.        Subjective      Answers submitted by the patient for this visit:  Office Visit on 6/25/2024 10:30 AM with Cindy Peterson (Submitted on 6/25/2024)  Please describe your symptoms.: Follow up  Have you had these symptoms before?: No  How long have you been having these symptoms?: 1-4 days    Past Medical History:   Diagnosis Date   • Arnold-Chiari deformity     1   • Bone cyst     WRIST   • Erectile dysfunction    • Fatigue    • Low testosterone    • Obesity    • Snoring    • Tobacco use disorder    • Visual impairment        Past Surgical History:   Procedure Laterality Date   • BRAIN SURGERY  02/03/2010    Chiari   • COLONOSCOPY     • OTHER SURGICAL HISTORY      ARNOLD CHIARI 1 SURGERY       Family History   Problem Relation Age of Onset   • Colon cancer Father    • Hypertension Father    • Cancer Father         Colon   • Diabetes Other    • Hearing loss Other         HEARING IMPAIRMENT       Social History     Socioeconomic History   • Marital status:    Tobacco Use   • Smoking status: Never   • Smokeless tobacco: Never   Vaping Use   • Vaping status: Never Used   Substance and Sexual Activity   • Alcohol use: Yes     Alcohol/week: 3.0 standard drinks of alcohol     Types: 1 Cans of beer, 2 Drinks containing 0.5 oz of alcohol per week   • Drug use: Never   • Sexual activity: Yes     Partners: Female       No current outpatient  "medications on file.    No Known Allergies    Objective     Physical Exam  Vitals and nursing note reviewed.   Constitutional:       General: He is not in acute distress.     Appearance: Normal appearance. He is not toxic-appearing.   HENT:      Head: Normocephalic.      Jaw: No tenderness.      Comments: Denies temporal artery tenderness or abnormality  Eyes:      Pupils: Pupils are equal, round, and reactive to light.   Neck:      Vascular: No carotid bruit.   Cardiovascular:      Rate and Rhythm: Normal rate and regular rhythm.      Heart sounds: No murmur heard.  Pulmonary:      Effort: Pulmonary effort is normal. No respiratory distress.      Breath sounds: Normal breath sounds.   Musculoskeletal:         General: Normal range of motion.      Cervical back: Normal range of motion and neck supple. No tenderness.      Right lower leg: No edema.      Left lower leg: No edema.   Skin:     General: Skin is warm and dry.   Neurological:      Mental Status: He is alert and oriented to person, place, and time.      Cranial Nerves: Cranial nerves 2-12 are intact.      Sensory: Sensation is intact.      Motor: Motor function is intact.      Coordination: Coordination is intact.      Gait: Gait is intact.   Psychiatric:         Mood and Affect: Mood normal.         Behavior: Behavior normal.       Vitals:    06/25/24 1037   BP: 138/88   BP Location: Left arm   Patient Position: Sitting   Cuff Size: Large Adult   Pulse: 86   Resp: 18   SpO2: 98%   Weight: 111 kg (245 lb 6.4 oz)   Height: 167.6 cm (65.98\")   PainSc: 0-No pain     Body mass index is 39.63 kg/m².           The 10-year ASCVD risk score (Anatoliy CARLSON, et al., 2019) is: 7.2%    PHQ-9 Total Score:       Assessment / Plan      Diagnoses and all orders for this visit:    1. Increased frequency of headaches (Primary)  Assessment & Plan:  Patient reports past history of Chiari malformation, had surgical intervention for this in February 2010.  Since that time patient " has not had issue but recently in the past 2 weeks has developed increased frequency of intermittent headache, atypical of previous headaches.  See HPI  Patient agrees to CT scan today and follow-up in 2 weeks unless otherwise indicated.  Reviewed signs and symptoms of stroke and heart attack and patient agrees to go to the ER or call 911 as appropriate, if he develops any alarm symptoms prior to his scheduled follow-up visit.    Orders:  -     CT Head With & Without Contrast; Future    2. Poor concentration  Assessment & Plan:  Patient continues to have some issues with focus, unchanged since last visit.  Patient does have appointment scheduled with behavioral health for evaluation-earliest appointment is scheduled in September.  Patient is agreeable to referral to alternative provider to see if he is able to get earlier appointment.  No change in treatment regimen at this time, will re- evaluate as needed or at routine wellness visits.    Orders:  -     Ambulatory Referral to Behavioral Health    Most recent diagnostic testing results were reviewed and discussed with the patient during their appointment today and all questions addressed to patient's satisfaction.     Return in about 2 weeks (around 7/9/2024) for Recheck - headaches (review CT scan results).    [facility]  [address] [facility] [id number] [phone]    NOTE TO PATIENT: The 21st Century Cures Act makes medical notes like these available to patients in the interest of transparency. However, be advised this is a medical document. It is intended as peer to peer communication. It is written in medical language and may contain abbreviations or verbiage that are unfamiliar. It may appear blunt or direct. Medical documents are intended to carry relevant information, facts as evident, and the clinical opinion of the practitioner.     EMR Dragon/Transcription  disclaimer:  Much of this encounter note is an electronic transcription of spoken language to printed text. Electronic transcription of spoken language may permit erroneous, or at times, nonsensical words or phrases to be inadvertently transcribed. Although I have reviewed the note for such errors, some may still exist.

## 2024-08-29 ENCOUNTER — OFFICE VISIT (OUTPATIENT)
Dept: FAMILY MEDICINE CLINIC | Facility: CLINIC | Age: 54
End: 2024-08-29
Payer: COMMERCIAL

## 2024-08-29 VITALS
DIASTOLIC BLOOD PRESSURE: 90 MMHG | BODY MASS INDEX: 37.88 KG/M2 | SYSTOLIC BLOOD PRESSURE: 134 MMHG | HEIGHT: 66 IN | HEART RATE: 75 BPM | OXYGEN SATURATION: 97 % | RESPIRATION RATE: 18 BRPM | WEIGHT: 235.69 LBS

## 2024-08-29 DIAGNOSIS — J32.0 CHRONIC SINUSITIS OF BOTH MAXILLARY SINUSES: ICD-10-CM

## 2024-08-29 DIAGNOSIS — R51.9 INCREASED FREQUENCY OF HEADACHES: ICD-10-CM

## 2024-08-29 DIAGNOSIS — R10.9 FLANK PAIN: ICD-10-CM

## 2024-08-29 DIAGNOSIS — I10 PRIMARY HYPERTENSION: Primary | ICD-10-CM

## 2024-08-29 DIAGNOSIS — N02.9 PERSISTENT HEMATURIA: ICD-10-CM

## 2024-08-29 DIAGNOSIS — G47.8 NON-RESTORATIVE SLEEP: ICD-10-CM

## 2024-08-29 LAB
BILIRUB BLD-MCNC: NEGATIVE MG/DL
CLARITY, POC: CLEAR
COLOR UR: YELLOW
EXPIRATION DATE: ABNORMAL
GLUCOSE UR STRIP-MCNC: NEGATIVE MG/DL
KETONES UR QL: NEGATIVE
LEUKOCYTE EST, POC: NEGATIVE
Lab: ABNORMAL
NITRITE UR-MCNC: NEGATIVE MG/ML
PH UR: 6 [PH] (ref 5–8)
PROT UR STRIP-MCNC: NEGATIVE MG/DL
RBC # UR STRIP: ABNORMAL /UL
SP GR UR: 1.02 (ref 1–1.03)
UROBILINOGEN UR QL: NORMAL

## 2024-08-29 PROCEDURE — 99214 OFFICE O/P EST MOD 30 MIN: CPT

## 2024-08-29 PROCEDURE — 81003 URINALYSIS AUTO W/O SCOPE: CPT

## 2024-08-29 RX ORDER — AMLODIPINE BESYLATE 5 MG/1
5 TABLET ORAL DAILY
Qty: 30 TABLET | Refills: 2 | Status: SHIPPED | OUTPATIENT
Start: 2024-08-29

## 2024-08-29 RX ORDER — LORATADINE 10 MG/1
10 TABLET ORAL DAILY
Qty: 30 TABLET | Refills: 2 | Status: SHIPPED | OUTPATIENT
Start: 2024-08-29

## 2024-08-29 RX ORDER — ACYCLOVIR 200 MG/1
200 CAPSULE ORAL
COMMUNITY
Start: 2024-08-27

## 2024-08-29 NOTE — PROGRESS NOTES
Follow Up Office Visit      Date:  2024   Patient Name: Otilio Fletcher  : 1970   MRN: [id number]     Chief Complaint   Patient presents with   • Headache     Follow up. Review CT.        History of Present Illness: Otilio Fletcher is a 54 y.o. male who is here today for follow up on headaches and CT results.  He reports he has been working rotating shifts and is having some difficulty with sleep.  Patient denies any new illness or injury since last visit. Denies falls, unexplained weight change, fevers, chills, or other constitutional symptoms and has no additional questions or concens at this time.    Home sleep study in the past-borderline result for over 5 years ago    Answers submitted by the patient for this visit:  Office Visit on 2024  8:00 AM with Cindy Peterson (Submitted on 2024)  Please describe your symptoms.: Na  Have you had these symptoms before?: No  How long have you been having these symptoms?: Greater than 2 weeks  Please list any medications you are currently taking for this condition.: Na      Subjective      Past Medical History:   Diagnosis Date   • Arnold-Chiari deformity     1   • Bone cyst     WRIST   • Erectile dysfunction    • Fatigue    • Low testosterone    • Obesity    • Snoring    • Tobacco use disorder    • Visual impairment        Past Surgical History:   Procedure Laterality Date   • BRAIN SURGERY  2010    Chiari   • COLONOSCOPY     • OTHER SURGICAL HISTORY      ARNOLD CHIARI 1 SURGERY       Family History   Problem Relation Age of Onset   • Colon cancer Father    • Hypertension Father    • Cancer Father         Colon   • Diabetes Other    • Hearing loss Other         HEARING IMPAIRMENT       Social History     Socioeconomic History   • Marital status:    Tobacco Use   • Smoking status: Never   • Smokeless tobacco: Never   Vaping Use   • Vaping status: Never Used   Substance and Sexual Activity   • Alcohol use: Yes     Alcohol/week: 3.0  standard drinks of alcohol     Types: 1 Cans of beer, 2 Drinks containing 0.5 oz of alcohol per week   • Drug use: Never   • Sexual activity: Yes     Partners: Female         Current Outpatient Medications:   •  acyclovir (ZOVIRAX) 200 MG capsule, Take 1 capsule by mouth 5 (Five) Times a Day., Disp: , Rfl:   •  amLODIPine (NORVASC) 5 MG tablet, Take 1 tablet by mouth Daily., Disp: 30 tablet, Rfl: 2  •  loratadine (Claritin) 10 MG tablet, Take 1 tablet by mouth Daily., Disp: 30 tablet, Rfl: 2    No Known Allergies    Objective     Physical Exam  Vitals and nursing note reviewed.   Constitutional:       General: He is not in acute distress.     Appearance: Normal appearance. He is not toxic-appearing.   HENT:      Head: Normocephalic.      Jaw: No tenderness.      Comments: Denies temporal artery tenderness or abnormality  Eyes:      Pupils: Pupils are equal, round, and reactive to light.   Neck:      Vascular: No carotid bruit.   Cardiovascular:      Rate and Rhythm: Normal rate and regular rhythm.      Heart sounds: No murmur heard.  Pulmonary:      Effort: Pulmonary effort is normal. No respiratory distress.      Breath sounds: Normal breath sounds.   Musculoskeletal:         General: Normal range of motion.      Cervical back: Normal range of motion and neck supple. No tenderness.      Right lower leg: No edema.      Left lower leg: No edema.   Skin:     General: Skin is warm and dry.   Neurological:      Mental Status: He is alert and oriented to person, place, and time.      Cranial Nerves: Cranial nerves 2-12 are intact.      Sensory: Sensation is intact.      Motor: Motor function is intact.      Coordination: Coordination is intact.      Gait: Gait is intact.   Psychiatric:         Mood and Affect: Mood normal.         Behavior: Behavior normal.       Vitals:    08/29/24 0809   BP: 134/90   BP Location: Right arm   Patient Position: Sitting   Cuff Size: Large Adult   Pulse: 75   Resp: 18   SpO2: 97%   Weight:  "107 kg (235 lb 11 oz)   Height: 167.6 cm (65.98\")   PainSc: 0-No pain     Body mass index is 38.06 kg/m².            The 10-year ASCVD risk score (Anatoliy CARLSON, et al., 2019) is: 11.3%    Values used to calculate the score:      Age: 54 years      Sex: Male      Is Non- : Yes      Diabetic: No      Tobacco smoker: No      Systolic Blood Pressure: 134 mmHg      Is BP treated: Yes      HDL Cholesterol: 51 mg/dL      Total Cholesterol: 233 mg/dL        Assessment / Plan      Diagnoses and all orders for this visit:    1. Primary hypertension (Primary)  Assessment & Plan:  New diagnosis of hypertension   Patient education materials attached to AVS related to hypertension, blood pressure log sheet, and signs and symptoms of stroke/heart attack. Materials were reviewed with patient during their office visit today and all questions addressed to patient's satisfaction.  Patient started on medication today, see orders  Patient agrees to report any medication side effects, monitor blood pressures at home and contact the office if blood pressures consistently greater than 140/90 or if they develop alarm symptoms.  Patient agrees if they develop any alarm symptoms we reviewed during their appointment today, they should proceed to the ER or contact 911 as indicated.  Patient agrees to follow-up in 2 to 4 weeks for recheck appointment     Orders:  -     amLODIPine (NORVASC) 5 MG tablet; Take 1 tablet by mouth Daily.  Dispense: 30 tablet; Refill: 2    2. Chronic sinusitis of both maxillary sinuses  Assessment & Plan:  Head CT for increased headaches  Reports shows chronic sinusitis  Patient to be seen by ENT for sleep evaluation and have also noted history of chronic sinusitis  Patient to start Claritin daily and see ENT  Will evaluate again when patient comes returns in 4 weeks for hypertension recheck    Orders:  -     loratadine (Claritin) 10 MG tablet; Take 1 tablet by mouth Daily.  Dispense: 30 tablet; " "Refill: 2    3. Increased frequency of headaches  Assessment & Plan:  Patient sent for CT with and without  CT showed no acute findings  CT was positive for chronic sinusitis  Patient to see ENT for AWAIS evaluation, will also asked them to address chronic sinusitis  No change in treatment regimen at this time  Will evaluate as needed and/or at routine wellness visits      4. Non-restorative sleep  Assessment & Plan:  Patient reports history of daytime fatigue  Has been told by significant other that he snores  He does work rotating shift work which likely contributes to fatigue  Recent diagnosis of hypertension  Has done home sleep study in the past that was \"borderline\"-this was over 5 years ago  Agrees to referral to ENT for AWAIS evaluation, see orders    Orders:  -     Ambulatory Referral to ENT (Otolaryngology)    5. Flank pain  Assessment & Plan:  Patient reports flank pain has mostly resolved but he does have intermittent flank pain he attributes to dehydration  History of urology evaluation related to hematuria  Patient reports previous urology evaluation approximately 4 5 years ago he believes with Dr. Mata  Patient has persistent hematuria  Discussed that hematuria is abnormal finding especially in male patient  Hematuria may be baseline for him, but we discussed referral to urology to rule out other causes since it has been quite sometime since his evaluation.  Patient agrees to referral back to urologist, Dr. Mata, see orders    Orders:  -     POC Urinalysis Dipstick, Automated    6. Persistent hematuria  -     Ambulatory Referral to Urology       Patient Education:   Reviewed medications, potential side effects and signs and symptoms to report.   Discussed risk versus benefits of treatment plan with patient and/or family-including medications, labs and radiology that may be ordered.    Addressed questions and concerns during visit. Patient and/or family verbalized understanding and agree with plan. "   Instructed to call the office with any questions and report to ER with any life-threatening symptoms.     Return in about 4 weeks (around 9/26/2024) for Hypertension recheck.    [facility] Kee ([facility]  [address] [facility] [address]  [phone]    NOTE TO PATIENT: The 21st Century Cures Act makes medical notes like these available to patients in the interest of transparency. However, be advised this is a medical document. It is intended as peer to peer communication. It is written in medical language and may contain abbreviations or verbiage that are unfamiliar. It may appear blunt or direct. Medical documents are intended to carry relevant information, facts as evident, and the clinical opinion of the practitioner.     EMR Dragon/Transcription disclaimer:  Much of this encounter note is an electronic transcription of spoken language to printed text. Electronic transcription of spoken language may permit erroneous, or at times, nonsensical words or phrases to be inadvertently transcribed. Although I have reviewed the note for such errors, some may still exist.

## 2024-08-29 NOTE — ASSESSMENT & PLAN NOTE
"Patient reports history of daytime fatigue  Has been told by significant other that he snores  He does work rotating shift work which likely contributes to fatigue  Recent diagnosis of hypertension  Has done home sleep study in the past that was \"borderline\"-this was over 5 years ago  Agrees to referral to ENT for AWAIS evaluation, see orders  "
Head CT for increased headaches  Reports shows chronic sinusitis  Patient to be seen by ENT for sleep evaluation and have also noted history of chronic sinusitis  Patient to start Claritin daily and see ENT  Will evaluate again when patient comes returns in 4 weeks for hypertension recheck  
New diagnosis of hypertension   Patient education materials attached to AVS related to hypertension, blood pressure log sheet, and signs and symptoms of stroke/heart attack. Materials were reviewed with patient during their office visit today and all questions addressed to patient's satisfaction.  Patient started on medication today, see orders  Patient agrees to report any medication side effects, monitor blood pressures at home and contact the office if blood pressures consistently greater than 140/90 or if they develop alarm symptoms.  Patient agrees if they develop any alarm symptoms we reviewed during their appointment today, they should proceed to the ER or contact 911 as indicated.  Patient agrees to follow-up in 2 to 4 weeks for recheck appointment   
Patient reports flank pain has mostly resolved but he does have intermittent flank pain he attributes to dehydration  History of urology evaluation related to hematuria  Patient reports previous urology evaluation approximately 4 5 years ago he believes with Dr. Mata  Patient has persistent hematuria  Discussed that hematuria is abnormal finding especially in male patient  Hematuria may be baseline for him, but we discussed referral to urology to rule out other causes since it has been quite sometime since his evaluation.  Patient agrees to referral back to urologist, Dr. Mata, see orders  
Patient sent for CT with and without  CT showed no acute findings  CT was positive for chronic sinusitis  Patient to see ENT for AWAIS evaluation, will also asked them to address chronic sinusitis  No change in treatment regimen at this time  Will evaluate as needed and/or at routine wellness visits  
Patient